# Patient Record
Sex: MALE | Race: BLACK OR AFRICAN AMERICAN | NOT HISPANIC OR LATINO | Employment: OTHER | URBAN - METROPOLITAN AREA
[De-identification: names, ages, dates, MRNs, and addresses within clinical notes are randomized per-mention and may not be internally consistent; named-entity substitution may affect disease eponyms.]

---

## 2020-09-04 PROBLEM — H25.811 COMBINED FORM OF AGE-RELATED CATARACT, RIGHT EYE: Status: ACTIVE | Noted: 2020-09-04

## 2020-09-17 ENCOUNTER — NURSE TRIAGE (OUTPATIENT)
Dept: ADMINISTRATIVE | Facility: CLINIC | Age: 70
End: 2020-09-17

## 2020-09-17 NOTE — TELEPHONE ENCOUNTER
Reason for Disposition   Information only question and nurse able to answer   No answer.  First attempt to contact caller.  Follow-up call scheduled within 15 minutes.    Protocols used: INFORMATION ONLY CALL - NO TRIAGE-A-OH, NO CONTACT OR DUPLICATE CONTACT CALL-A-OH

## 2020-09-17 NOTE — TELEPHONE ENCOUNTER
This is day 13 post-procedure. Attempted to contact pt via all numbers on chart without success. No further outreach required at this time. Chart will be closed per [facility] ([facility] Post-Pro[facility] COVID Symptom Tracker) policy.

## 2020-09-22 PROBLEM — Z98.890 S/P EYE SURGERY: Status: ACTIVE | Noted: 2020-09-22

## 2022-10-07 PROBLEM — H25.812 COMBINED FORM OF AGE-RELATED CATARACT, LEFT EYE: Status: ACTIVE | Noted: 2022-10-07
